# Patient Record
Sex: MALE | Race: OTHER | Employment: PART TIME | URBAN - METROPOLITAN AREA
[De-identification: names, ages, dates, MRNs, and addresses within clinical notes are randomized per-mention and may not be internally consistent; named-entity substitution may affect disease eponyms.]

---
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
## 2021-05-14 ENCOUNTER — IMMUNIZATION (OUTPATIENT)
Dept: LAB | Facility: HOSPITAL | Age: 21
End: 2021-05-14
Attending: EMERGENCY MEDICINE
Payer: COMMERCIAL

## 2021-05-14 DIAGNOSIS — Z23 NEED FOR VACCINATION: Primary | ICD-10-CM

## 2021-05-14 PROCEDURE — 0001A SARSCOV2 VAC 30MCG/0.3ML IM: CPT

## 2021-06-06 ENCOUNTER — IMMUNIZATION (OUTPATIENT)
Dept: LAB | Facility: HOSPITAL | Age: 21
End: 2021-06-06
Attending: EMERGENCY MEDICINE
Payer: COMMERCIAL

## 2021-06-06 DIAGNOSIS — Z23 NEED FOR VACCINATION: Primary | ICD-10-CM

## 2021-06-06 PROCEDURE — 0002A SARSCOV2 VAC 30MCG/0.3ML IM: CPT

## 2024-05-14 LAB
ALBUMIN SERPL-MCNC: 4.3 G/DL (ref 3.4–5)
ALBUMIN/GLOB SERPL: 1.3 {RATIO} (ref 1–2)
ALP LIVER SERPL-CCNC: 71 U/L
ALT SERPL-CCNC: 24 U/L
ANION GAP SERPL CALC-SCNC: 5 MMOL/L (ref 0–18)
AST SERPL-CCNC: 16 U/L (ref 15–37)
BASOPHILS # BLD AUTO: 0.1 X10(3) UL (ref 0–0.2)
BASOPHILS NFR BLD AUTO: 1.4 %
BILIRUB SERPL-MCNC: 0.4 MG/DL (ref 0.1–2)
BUN BLD-MCNC: 17 MG/DL (ref 9–23)
CALCIUM BLD-MCNC: 9.3 MG/DL (ref 8.5–10.1)
CHLORIDE SERPL-SCNC: 110 MMOL/L (ref 98–112)
CO2 SERPL-SCNC: 25 MMOL/L (ref 21–32)
CREAT BLD-MCNC: 1 MG/DL
EGFRCR SERPLBLD CKD-EPI 2021: 108 ML/MIN/1.73M2 (ref 60–?)
EOSINOPHIL # BLD AUTO: 0.74 X10(3) UL (ref 0–0.7)
EOSINOPHIL NFR BLD AUTO: 10.5 %
ERYTHROCYTE [DISTWIDTH] IN BLOOD BY AUTOMATED COUNT: 12.3 %
GLOBULIN PLAS-MCNC: 3.2 G/DL (ref 2.8–4.4)
GLUCOSE BLD-MCNC: 91 MG/DL (ref 70–99)
HCT VFR BLD AUTO: 42.1 %
HGB BLD-MCNC: 14.4 G/DL
IMM GRANULOCYTES # BLD AUTO: 0.01 X10(3) UL (ref 0–1)
IMM GRANULOCYTES NFR BLD: 0.1 %
LYMPHOCYTES # BLD AUTO: 2.53 X10(3) UL (ref 1–4)
LYMPHOCYTES NFR BLD AUTO: 36 %
MCH RBC QN AUTO: 29.1 PG (ref 26–34)
MCHC RBC AUTO-ENTMCNC: 34.2 G/DL (ref 31–37)
MCV RBC AUTO: 85.2 FL
MONOCYTES # BLD AUTO: 0.54 X10(3) UL (ref 0.1–1)
MONOCYTES NFR BLD AUTO: 7.7 %
NEUTROPHILS # BLD AUTO: 3.11 X10 (3) UL (ref 1.5–7.7)
NEUTROPHILS # BLD AUTO: 3.11 X10(3) UL (ref 1.5–7.7)
NEUTROPHILS NFR BLD AUTO: 44.3 %
OSMOLALITY SERPL CALC.SUM OF ELEC: 291 MOSM/KG (ref 275–295)
PLATELET # BLD AUTO: 225 10(3)UL (ref 150–450)
POTASSIUM SERPL-SCNC: 3.9 MMOL/L (ref 3.5–5.1)
PROT SERPL-MCNC: 7.5 G/DL (ref 6.4–8.2)
RBC # BLD AUTO: 4.94 X10(6)UL
SODIUM SERPL-SCNC: 140 MMOL/L (ref 136–145)
WBC # BLD AUTO: 7 X10(3) UL (ref 4–11)

## 2024-05-14 PROCEDURE — 86901 BLOOD TYPING SEROLOGIC RH(D): CPT | Performed by: EMERGENCY MEDICINE

## 2024-05-14 PROCEDURE — 80053 COMPREHEN METABOLIC PANEL: CPT | Performed by: EMERGENCY MEDICINE

## 2024-05-14 PROCEDURE — 36415 COLL VENOUS BLD VENIPUNCTURE: CPT

## 2024-05-14 PROCEDURE — 86850 RBC ANTIBODY SCREEN: CPT | Performed by: EMERGENCY MEDICINE

## 2024-05-14 PROCEDURE — 86900 BLOOD TYPING SEROLOGIC ABO: CPT | Performed by: EMERGENCY MEDICINE

## 2024-05-14 PROCEDURE — 99284 EMERGENCY DEPT VISIT MOD MDM: CPT

## 2024-05-14 PROCEDURE — 85025 COMPLETE CBC W/AUTO DIFF WBC: CPT | Performed by: EMERGENCY MEDICINE

## 2024-05-14 PROCEDURE — 99285 EMERGENCY DEPT VISIT HI MDM: CPT

## 2024-05-15 ENCOUNTER — APPOINTMENT (OUTPATIENT)
Dept: CT IMAGING | Facility: HOSPITAL | Age: 24
End: 2024-05-15
Attending: EMERGENCY MEDICINE

## 2024-05-15 ENCOUNTER — HOSPITAL ENCOUNTER (EMERGENCY)
Facility: HOSPITAL | Age: 24
Discharge: HOME | End: 2024-05-15
Attending: EMERGENCY MEDICINE

## 2024-05-15 VITALS
BODY MASS INDEX: 23.91 KG/M2 | TEMPERATURE: 98 F | WEIGHT: 167 LBS | HEIGHT: 70 IN | DIASTOLIC BLOOD PRESSURE: 64 MMHG | SYSTOLIC BLOOD PRESSURE: 135 MMHG | OXYGEN SATURATION: 97 % | HEART RATE: 82 BPM | RESPIRATION RATE: 16 BRPM

## 2024-05-15 DIAGNOSIS — K62.5 RECTAL BLEEDING: Primary | ICD-10-CM

## 2024-05-15 LAB
ANTIBODY SCREEN: NEGATIVE
RH BLOOD TYPE: POSITIVE
RH BLOOD TYPE: POSITIVE

## 2024-05-15 PROCEDURE — 74177 CT ABD & PELVIS W/CONTRAST: CPT | Performed by: EMERGENCY MEDICINE

## 2024-05-15 NOTE — ED INITIAL ASSESSMENT (HPI)
Pt presents with rectal bleeding, bloody stools, bright red blood that started on Saturday night. Pt was seen for this on Monday by his PCP, diagnosed with hemorrhoids but pt states \"he was not checked for this.\"  Today with mid abdominal pain and nausea, no vomiting, loose stools.   Pt currently on abx for sinus infection.

## 2024-05-15 NOTE — ED PROVIDER NOTES
Patient Seen in: [facility] Emergency Department      History     Chief Complaint   Patient presents with    Anal Problem     Stated Complaint: RECTAL BLEED , NOW WITH PAIN    Subjective:   HPI    23-year-old male presenting with rectal bleeding initially had some fresh blood passed per rectum couple days ago had a couple days of no symptoms and then today had several episodes of bright red blood per rectum no clots he also has a little bit of periumbilical discomfort.  He denies any sort of nausea vomiting diarrhea any painful passing of stools any other exacerbating factors no family history of bowel disease.  No other exacerbating relieving factors or associated symptoms.    Objective:   Past Medical History:    Asthma (HCC)    Migraines              Past Surgical History:   Procedure Laterality Date    Excis/curet benign tumr prox humerus Left 11/20/2012                Social History     Socioeconomic History    Marital status: Single   Tobacco Use    Smoking status: Never    Smokeless tobacco: Never   Vaping Use    Vaping status: Never Used   Substance and Sexual Activity    Alcohol use: Never    Drug use: Never     Social Determinants of Health      Received from [facility]    Housing Stability              Review of Systems    Positive for stated complaint: RECTAL BLEED , NOW WITH PAIN  Other systems are as noted in HPI.  Constitutional and vital signs reviewed.      All other systems reviewed and negative except as noted above.    Physical Exam     ED Triage Vitals [05/14/24 2305]   /75   Pulse 79   Resp 18   Temp 97.8 °F (36.6 °C)   Temp src Temporal   SpO2 98 %   O2 Device None (Room air)       Current Vitals:   Vital Signs  BP: 135/75  Pulse: 79  Resp: 18  Temp: 97.8 °F (36.6 °C)  Temp src: Temporal  MAP (mmHg): 95    Oxygen Therapy  SpO2: 98 %  O2 Device: None (Room air)            Physical Exam  Awake alert patient appears no distress HEENT exam is normal lungs are clear  cardiovascular exam shows regular rhythm abdomen soft nontender extremities no COVID cyanosis or edema no anal fissures noted no external hemorrhoids noted.  Skin exam is normal skin is nondiaphoretic no focal neurologic deficits       ED Course     Labs Reviewed   CBC W/ DIFFERENTIAL - Abnormal; Notable for the following components:       Result Value    Eosinophil Absolute 0.74 (*)     All other components within normal limits   COMP METABOLIC PANEL (14) - Normal   CBC WITH DIFFERENTIAL WITH PLATELET    Narrative:     The following orders were created for panel order CBC With Differential With Platelet.  Procedure                               Abnormality         Status                     ---------                               -----------         ------                     CBC W/ DIFFERENTIAL[[id number]]          Abnormal            Final result                 Please view results for these tests on the individual orders.   TYPE AND SCREEN    Narrative:     The following orders were created for panel order Type and screen.  Procedure                               Abnormality         Status                     ---------                               -----------         ------                     ABORH (Blood Type)[[id number]]                               Final result               Antibody Screen[[id number]]                                  Final result                 Please view results for these tests on the individual orders.   ABORH (BLOOD TYPE)   ANTIBODY SCREEN   ABORH CONFIRMATION             Differential diagnosis includes GI bleed, anemia         MDM                                         Medical Decision Making  23-year-old male presenting with rectal bleeding IV established cardiac monitor shows a sinus rhythm pulse ox shows no signs of hypoxia CBC shows stable hemoglobin level metabolic panel stable renal function.  Independent interpretation by ED physician of CT scan shows no perforation or  obstruction.  Patient will be discharged home since he is hemodynamically stable with a stable hemoglobin level to follow-up with GI services and is to return emerged part worsening symptoms other complaints  The patient was screened and evaluated during this visit.  As a treating physician attending to the patient, I determined, within reasonable clinical confidence and prior to discharge, that an emergency medical condition was not or was no longer present.  There was no indication for further evaluation, treatment or admission on an emergency basis.    The usual and customary discharge instructions were discussed given the patient's ER course.  We discussed signs and symptoms that should prompt the patient's immediate return to the emergency department.  Reasonable over-the-counter and prescription treatment options and physician follow-up plan was discussed.  Patient was discharged home in good condition  This note was prepared using Dragon Medical voice recognition dictation software.  As a result errors may occur.  When identified to these areas have been corrected.  While every attempt is made to correct errors during dictation discrepancies may still exist.  Please contact if there are any errors    Problems Addressed:  Rectal bleeding: acute illness or injury    Amount and/or Complexity of Data Reviewed  Labs: ordered. Decision-making details documented in ED Course.  Radiology: ordered and independent interpretation performed. Decision-making details documented in ED Course.  ECG/medicine tests: ordered and independent interpretation performed. Decision-making details documented in ED Course.        Disposition and Plan     Clinical Impression:  1. Rectal bleeding         Disposition:  Discharge  5/15/2024  2:03 am    Follow-up:  Doug Luciano MD  [facility] [address]  [address]  [facility] [address]  [phone]    Follow up in 1 week(s)            Medications Prescribed:  Current Discharge Medication  List

## 2024-11-10 LAB — AMB EXT COVID-19 RESULT: DETECTED

## 2024-11-12 ENCOUNTER — E-VISIT (OUTPATIENT)
Dept: TELEHEALTH | Age: 24
End: 2024-11-12
Payer: COMMERCIAL

## 2024-11-12 DIAGNOSIS — U07.1 COVID: Primary | ICD-10-CM

## 2024-11-12 PROCEDURE — 99423 OL DIG E/M SVC 21+ MIN: CPT | Performed by: PHYSICIAN ASSISTANT

## 2024-11-12 RX ORDER — DEXTROMETHORPHAN POLISTIREX 30 MG/5ML
10 SUSPENSION ORAL 2 TIMES DAILY PRN
COMMUNITY
Start: 2024-11-10 | End: 2024-11-17

## 2024-11-12 NOTE — PROGRESS NOTES
Bib Cheung is a 24 year old male who initiated e-visit care today.    HPI:   See answers to questionnaire submission     Current Outpatient Medications   Medication Sig Dispense Refill    dextromethorphan polistirex ER 30 MG/5ML Oral Suspension Extended Release Take 10 mL (60 mg total) by mouth 2 (two) times daily as needed.      nirmatrelvir-ritonavir 300-100 MG Oral Tablet Therapy Pack 300 mg nirmatrelvir (two 150-mg tablets) with 100 mg ritonavir (one 100-mg tablet) with all 3 tablets taken together orally twice daily for 5 days      Levalbuterol Tartrate 45 MCG/ACT Inhalation Aerosol Inhale 2 puffs into the lungs every 4 (four) hours as needed for Wheezing.      ADVAIR -21 MCG/ACT Inhalation Aerosol TAKE 2 PUFFS BY MOUTH TWICE A DAY (Patient not taking: Reported on 6/28/2021)  3      Past Medical History:    Asthma (HCC)    Migraines      Past Surgical History:   Procedure Laterality Date    Excis/curet benign tumr prox humerus Left 11/20/2012      History reviewed. No pertinent family history.   Social History:  Social History     Socioeconomic History    Marital status: Single   Tobacco Use    Smoking status: Never    Smokeless tobacco: Never   Vaping Use    Vaping status: Never Used   Substance and Sexual Activity    Alcohol use: Never    Drug use: Never     Social Drivers of Health      Received from [facility]    Housing Stability         ASSESSMENT AND PLAN:       Diagnoses and all orders for this visit:    COVID     Counseled on fever care, expected duration of fever and definition of fever. If truly having fever for an additional 48 hours needs in-person evaluation.  Work note provided.       Duration of  the service:  22 minutes      See [employer] message exchange and Patient Instructions for Comfort Care and patient education.

## 2025-06-02 ENCOUNTER — E-VISIT (OUTPATIENT)
Dept: TELEHEALTH | Age: 25
End: 2025-06-02
Payer: COMMERCIAL

## 2025-06-02 DIAGNOSIS — J45.909 UNCOMPLICATED ASTHMA, UNSPECIFIED ASTHMA SEVERITY, UNSPECIFIED WHETHER PERSISTENT (HCC): Primary | ICD-10-CM

## 2025-06-02 RX ORDER — LEVALBUTEROL TARTRATE 45 UG/1
1-2 AEROSOL, METERED ORAL
Qty: 1 EACH | Refills: 0 | Status: SHIPPED | OUTPATIENT
Start: 2025-06-02

## 2025-06-02 NOTE — PROGRESS NOTES
HPI:  Bib Cheung is a 24 year old male who presents for an evisit.  See e-visit questionnaire submission and messages.    Current Medications[1]  Past Medical History[2]  Past Surgical History[3]    Short Social Hx on File[4]       No results found for this or any previous visit (from the past 24 hours).    ASSESSMENT AND PLAN:      ASSESSMENT:   Encounter Diagnosis   Name Primary?    Uncomplicated asthma, unspecified asthma severity, unspecified whether persistent (HCC) Yes       PLAN: Meds as below.  See patient Instructions and visit messages.   -Total of 13 minutes spent with patient.    Meds & Refills for this Visit:  Requested Prescriptions     Signed Prescriptions Disp Refills    Levalbuterol Tartrate 45 MCG/ACT Inhalation Aerosol 1 each 0     Sig: Inhale 1-2 puffs into the lungs every 4 to 6 hours as needed for Wheezing or Shortness of Breath (Max 12 puffs per day).       Risks, benefits, and side effects of medication explained and discussed.    There are no Patient Instructions on file for this visit.    The patient indicates understanding of these issues and agrees to the plan.  See attached patient references.  The patient is asked to return if sx's persist or worsen.    Bib Cheung understands evisit evaluation is not a substitute for face-to-face examination or emergency care. Patient advised to go to ER or call 911 for worsening symptoms or acute distress.         [1]   Current Outpatient Medications   Medication Sig Dispense Refill    Levalbuterol Tartrate 45 MCG/ACT Inhalation Aerosol Inhale 1-2 puffs into the lungs every 4 to 6 hours as needed for Wheezing or Shortness of Breath (Max 12 puffs per day). 1 each 0    Levalbuterol Tartrate 45 MCG/ACT Inhalation Aerosol Inhale 2 puffs into the lungs every 4 (four) hours as needed for Wheezing.      ADVAIR -21 MCG/ACT Inhalation Aerosol TAKE 2 PUFFS BY MOUTH TWICE A DAY (Patient not taking: Reported on 6/28/2021)  3   [2]   Past Medical  History:   Asthma (HCC)    Migraines   [3]   Past Surgical History:  Procedure Laterality Date    Excis/curet benign tumr prox humerus Left 11/20/2012   [4]   Social History  Socioeconomic History    Marital status: Single   Tobacco Use    Smoking status: Never    Smokeless tobacco: Never   Vaping Use    Vaping status: Never Used   Substance and Sexual Activity    Alcohol use: Never    Drug use: Never     Social Drivers of Health      Received from [facility]    Housing Stability

## (undated) NOTE — ED AVS SNAPSHOT
[facility] Emergency Department    [place]  [address] [place] [address]    Phone:  [phone]    Fax:  [address]   MRN: [id number]    Department:  [facility] Emergency Department   Date of Visit:  1/19/ nuestro adminstrador de rian swartz [phone]    Expect to receive an electronic request (by e-mail or text) to complete a self-assessment the day after your visit. You may also receive a call from our patient liason soon after your visit.  Also, some p [facility] [address]  ([address]) [address] [facility] [phone] [address] ([address] [place] [address]) [phone] [address] [address] [address] [facility] Call [phone] for help. [employer] is NOT to be used for urgent needs. For medical emergencies, dial 911.

## (undated) NOTE — LETTER
Date: 8/12/2018    Patient Name: Rusty Jackson          To Whom it may concern: This letter has been written at the patient's request. The above patient was seen at the [facility] for treatment of a medical condition.     The patient may r

## (undated) NOTE — LETTER
[facility] MEDICAL [facility], VIRTUAL VISIT  [address] [facility] [address]  [phone]          11/12/24    Bib Cheung  11/6/2000        This document is to verify Bib Cheung was seen for evaluation and treatment.    Please excuse the patient from work until he has been fever free for 24 hours without the use of fever reducing medications.       Please call the number listed above if you have further questions.        Thank you,          Mary Lou Aguilar PA-C

## (undated) NOTE — ED AVS SNAPSHOT
Kendrick Romano   MRN: [id number]    Department:  [facility] Emergency Department   Date of Visit:  9/26/2017           Disclosure     Insurance plans vary and the physician(s) referred by the ER may not be covered by your plan.  Please contact your If you have been prescribed any medication(s), please fill your prescription right away and begin taking the medication(s) as directed    If the emergency physician has read X-rays, these will be re-interpreted by a radiologist.  If there is a significant

## (undated) NOTE — LETTER
Date & Time: 9/6/2018, 2:41 PM  Patient: Skylar Crum  Encounter Provider(s):    Nevin Garcia MD       To Whom It May Concern:    Cirilo Quiroga was seen and treated in our department on 9/6/2018.     If you have any questions or concerns, please

## (undated) NOTE — ED AVS SNAPSHOT
[facility] Emergency Department    [place]  [address] [place] [address]    Phone:  [phone]    Fax:  [address]   MRN: [id number]    Department:  [facility] Emergency Department   Date of Visit:  1/19/ IF THERE IS ANY CHANGE OR WORSENING OF YOUR CONDITION, CALL YOUR PRIMARY CARE PHYSICIAN AT ONCE OR RETURN IMMEDIATELY TO THE EMERGENCY DEPARTMENT.     If you have been prescribed any medication(s), please fill your prescription right away and begin taking t

## (undated) NOTE — LETTER
January 20, 2017    Patient: En Amie   Date of Visit: 1/19/2017       To Whom It May Concern:    Damian Lopes was seen and treated in our emergency department on 1/19/2017. He should not return to school until 1/23/2017.     If you have any quest

## (undated) NOTE — LETTER
September 26, 2017    Patient: Bryce Johnson   Date of Visit: 9/26/2017       To Whom It May Concern:    Esteban Jennings was seen and treated in our emergency department on 9/26/2017.      If you have any questions or concerns, please don't hesitate to ca

## (undated) NOTE — ED AVS SNAPSHOT
Skylar Crum   MRN: [id number]    Department:  [facility] Emergency Department   Date of Visit:  9/6/2018           Disclosure     Insurance plans vary and the physician(s) referred by the ER may not be covered by your plan.  Please contact your tell this physician (or your personal doctor if your instructions are to return to your personal doctor) about any new or lasting problems. The primary care or specialist physician will see patients referred from the [facility] Emergency Department.  Salvadore Skiff